# Patient Record
Sex: MALE | Race: WHITE | NOT HISPANIC OR LATINO | Employment: OTHER | URBAN - METROPOLITAN AREA
[De-identification: names, ages, dates, MRNs, and addresses within clinical notes are randomized per-mention and may not be internally consistent; named-entity substitution may affect disease eponyms.]

---

## 2020-10-19 ENCOUNTER — HOSPITAL ENCOUNTER (EMERGENCY)
Facility: CLINIC | Age: 55
Discharge: STILL ADMITTED OR ADMINISTRATIVE | End: 2020-10-19
Payer: COMMERCIAL

## 2021-08-26 NOTE — PROGRESS NOTES
CARDIOLOGY CONSULT    REASON FOR CONSULT: CAD    PRIMARY CARE PHYSICIAN:  Gerry Ellis    HISTORY OF PRESENT ILLNESS:  56-year-old male seen for CAD.    2017 he presented with STEMI in [place].  He had 100% proximal LAD occlusion, minimal disease elsewhere.  Single drug-eluting stent was placed to the proximal LAD.  Echo showed EF 35%, mid to distal anteroseptal akinesis, no valve disease.    Outside echo May 2019 showed EF 50%, anterior akinesis, no valve disease.    Echo April 2021 from [facility] showed EF 40%, mid to distal anteroseptal and apical hypokinesis, no valve disease.  Nuclear stress showed medium to large transmural infarct of the mid to apical anterior and anteroseptal walls, EF 40%.    He has been feeling well recently.  He is very physically active.  He will go biking up to 60 minutes and do other physical activity.  He denies any exertional chest pain.  He will have some dyspnea at times when he lays down at night.  He has as needed Lasix for this occasion, but has not used it.  Blood pressure runs about 100/60.     PAST MEDICAL HISTORY:  1.  CAD    MEDICATIONS:  Current Outpatient Medications   Medication     atorvastatin (LIPITOR) 80 MG tablet     losartan (COZAAR) 50 MG tablet     metoprolol succinate ER (TOPROL-XL) 100 MG 24 hr tablet     nitroGLYcerin (NITROSTAT) 0.4 MG sublingual tablet     spironolactone (ALDACTONE) 25 MG tablet     No current facility-administered medications for this visit.     ALLERGIES:  Not on File    SOCIAL HISTORY:  Non-smoker.  Minimal alcohol use.    FAMILY HISTORY:  No premature CAD.    REVIEW OF SYSTEMS:  Constitutional:  No weight loss, fever, chills, weakness or fatigue.  HEENT:  Eyes:  No visual loss, blurred vision, double vision or yellow sclerae. No hearing loss, sneezing, congestion, runny nose or sore throat.  Skin:  No rash or itching.  Cardiovascular: per HPI  Respiratory: per HPI  GI:  No anorexia, nausea, vomiting or diarrhea. No  "abdominal pain or blood.  :  No dysurea, hematuria  Neurologic:  No headache, dizziness, syncope, paralysis, ataxia, numbness or tingling in the extremities. No change in bowel or bladder control.  Musculoskeletal:  No muscle, back pain, joint pain or stiffness.  Hematologic:  No anemia, bleeding or bruising.  Lymphatics:  No enlarged nodes. No history of splenectomy.  Psychiatric:  No history of depression or anxiety.  Endocrine:  No reports of sweating, cold or heat intolerance. No polyuria or polydipsia.  Allergies:  No history of asthma, hives, eczema or rhinitis.    PHYSICAL EXAM:  /66 (BP Location: Right arm, Patient Position: Chair, Cuff Size: Adult Regular)   Pulse 55   Ht 1.753 m (5' 9\")   Wt 85.4 kg (188 lb 4.8 oz)   SpO2 99%   BMI 27.81 kg/m      Constitutional: awake, alert, no distress  Eyes: PERRL, sclera nonicteric  ENT: trachea midline  Respiratory: Lungs clear  Cardiovascular: Regular rate and rhythm, no murmurs  GI: nondistended, nontender, bowel sounds present  Lymph/Hematologic: no lymphadenopathy  Skin: dry, no rash  Musculoskeletal: good muscle tone, strength 5/5 in upper and lower extremities  Neurologic: no focal deficits  Neuropsychiatric: appropriate affact    DATA:  Labs:   January 2021: Cholesterol 122, HDL 43, triglycerides 99, LDL 59  April 2021: Creatinine 0.8    EKG: September 1, 2021: Sinus bradycardia, rate 50, anterior Q waves    ASSESSMENT:  56-year-old male seen for ischemic cardiomyopathy.  He is doing very well with no concerning symptoms.  He has occasional dyspnea at night.  He could take Lasix as needed, he may have a touch of fluid retention intermittently.  Otherwise he has no anginal symptoms.  Echo will be repeated next year.  He is on good medical therapy.    RECOMMENDATIONS:  1.  Ischemic cardiomyopathy, NYHA class I  -Continue current medications  -Echo in 1 year on follow-up  -Lasix as needed for dyspnea    Follow-up in 1 year after echo.    Levy" MD Sky  Cardiology - [facility]  Pager:  [phone]  Text Page  September 1, 2021

## 2021-09-01 ENCOUNTER — OFFICE VISIT (OUTPATIENT)
Dept: CARDIOLOGY | Facility: CLINIC | Age: 56
End: 2021-09-01
Payer: COMMERCIAL

## 2021-09-01 VITALS
WEIGHT: 188.3 LBS | HEART RATE: 55 BPM | SYSTOLIC BLOOD PRESSURE: 110 MMHG | OXYGEN SATURATION: 99 % | DIASTOLIC BLOOD PRESSURE: 66 MMHG | BODY MASS INDEX: 27.89 KG/M2 | HEIGHT: 69 IN

## 2021-09-01 DIAGNOSIS — I25.5 ISCHEMIC CARDIOMYOPATHY: ICD-10-CM

## 2021-09-01 DIAGNOSIS — Z13.6 SCREENING FOR HEART DISEASE: Primary | ICD-10-CM

## 2021-09-01 PROCEDURE — 99204 OFFICE O/P NEW MOD 45 MIN: CPT | Mod: 25 | Performed by: INTERNAL MEDICINE

## 2021-09-01 PROCEDURE — 93000 ELECTROCARDIOGRAM COMPLETE: CPT | Performed by: INTERNAL MEDICINE

## 2021-09-01 RX ORDER — LOSARTAN POTASSIUM 50 MG/1
50 TABLET ORAL DAILY
COMMUNITY
Start: 2021-06-29

## 2021-09-01 RX ORDER — METOPROLOL SUCCINATE 100 MG/1
100 TABLET, EXTENDED RELEASE ORAL
COMMUNITY
Start: 2020-02-13

## 2021-09-01 RX ORDER — NITROGLYCERIN 0.4 MG/1
0.4 TABLET SUBLINGUAL EVERY 5 MIN PRN
Qty: 20 TABLET | Refills: 1 | Status: SHIPPED | OUTPATIENT
Start: 2021-09-01 | End: 2023-09-06

## 2021-09-01 RX ORDER — SPIRONOLACTONE 25 MG/1
1 TABLET ORAL
COMMUNITY
Start: 2020-01-17

## 2021-09-01 RX ORDER — NITROGLYCERIN 0.4 MG/1
0.4 TABLET SUBLINGUAL
COMMUNITY
Start: 2020-02-13 | End: 2021-09-01

## 2021-09-01 RX ORDER — ATORVASTATIN CALCIUM 80 MG/1
80 TABLET, FILM COATED ORAL
COMMUNITY
Start: 2020-02-13

## 2021-09-01 ASSESSMENT — MIFFLIN-ST. JEOR: SCORE: 1679.5

## 2021-09-01 NOTE — LETTER
9/1/2021    Gerry Ellis PA-C  [id number] [facility] [address]    RE: Alvarado Brien       Dear Colleague,    I had the pleasure of seeing Alvarado Tesfaye in the [facility].    CARDIOLOGY CONSULT    REASON FOR CONSULT: CAD    PRIMARY CARE PHYSICIAN:  Gerry Ellis    HISTORY OF PRESENT ILLNESS:  56-year-old male seen for CAD.    2017 he presented with STEMI in [place].  He had 100% proximal LAD occlusion, minimal disease elsewhere.  Single drug-eluting stent was placed to the proximal LAD.  Echo showed EF 35%, mid to distal anteroseptal akinesis, no valve disease.    Outside echo May 2019 showed EF 50%, anterior akinesis, no valve disease.    Echo April 2021 from [facility] showed EF 40%, mid to distal anteroseptal and apical hypokinesis, no valve disease.  Nuclear stress showed medium to large transmural infarct of the mid to apical anterior and anteroseptal walls, EF 40%.    He has been feeling well recently.  He is very physically active.  He will go biking up to 60 minutes and do other physical activity.  He denies any exertional chest pain.  He will have some dyspnea at times when he lays down at night.  He has as needed Lasix for this occasion, but has not used it.  Blood pressure runs about 100/60.     PAST MEDICAL HISTORY:  1.  CAD    MEDICATIONS:  Current Outpatient Medications   Medication     atorvastatin (LIPITOR) 80 MG tablet     losartan (COZAAR) 50 MG tablet     metoprolol succinate ER (TOPROL-XL) 100 MG 24 hr tablet     nitroGLYcerin (NITROSTAT) 0.4 MG sublingual tablet     spironolactone (ALDACTONE) 25 MG tablet     No current facility-administered medications for this visit.     ALLERGIES:  Not on File    SOCIAL HISTORY:  Non-smoker.  Minimal alcohol use.    FAMILY HISTORY:  No premature CAD.    REVIEW OF SYSTEMS:  Constitutional:  No weight loss, fever, chills, weakness or fatigue.  HEENT:  Eyes:  No visual loss,  "blurred vision, double vision or yellow sclerae. No hearing loss, sneezing, congestion, runny nose or sore throat.  Skin:  No rash or itching.  Cardiovascular: per HPI  Respiratory: per HPI  GI:  No anorexia, nausea, vomiting or diarrhea. No abdominal pain or blood.  :  No dysurea, hematuria  Neurologic:  No headache, dizziness, syncope, paralysis, ataxia, numbness or tingling in the extremities. No change in bowel or bladder control.  Musculoskeletal:  No muscle, back pain, joint pain or stiffness.  Hematologic:  No anemia, bleeding or bruising.  Lymphatics:  No enlarged nodes. No history of splenectomy.  Psychiatric:  No history of depression or anxiety.  Endocrine:  No reports of sweating, cold or heat intolerance. No polyuria or polydipsia.  Allergies:  No history of asthma, hives, eczema or rhinitis.    PHYSICAL EXAM:  /66 (BP Location: Right arm, Patient Position: Chair, Cuff Size: Adult Regular)   Pulse 55   Ht 1.753 m (5' 9\")   Wt 85.4 kg (188 lb 4.8 oz)   SpO2 99%   BMI 27.81 kg/m      Constitutional: awake, alert, no distress  Eyes: PERRL, sclera nonicteric  ENT: trachea midline  Respiratory: Lungs clear  Cardiovascular: Regular rate and rhythm, no murmurs  GI: nondistended, nontender, bowel sounds present  Lymph/Hematologic: no lymphadenopathy  Skin: dry, no rash  Musculoskeletal: good muscle tone, strength 5/5 in upper and lower extremities  Neurologic: no focal deficits  Neuropsychiatric: appropriate affact    DATA:  Labs:   January 2021: Cholesterol 122, HDL 43, triglycerides 99, LDL 59  April 2021: Creatinine 0.8    EKG: September 1, 2021: Sinus bradycardia, rate 50, anterior Q waves    ASSESSMENT:  56-year-old male seen for ischemic cardiomyopathy.  He is doing very well with no concerning symptoms.  He has occasional dyspnea at night.  He could take Lasix as needed, he may have a touch of fluid retention intermittently.  Otherwise he has no anginal symptoms.  Echo will be repeated next " year.  He is on good medical therapy.    RECOMMENDATIONS:  1.  Ischemic cardiomyopathy, NYHA class I  -Continue current medications  -Echo in 1 year on follow-up  -Lasix as needed for dyspnea    Follow-up in 1 year after echo.    Levy Swanson MD  Cardiology - [facility]  Pager:  [phone]  Text Page  September 1, 2021          Thank you for allowing me to participate in the care of your patient.      Sincerely,     Levy Swanson MD     [facility]  cc:   No referring provider defined for this encounter.

## 2023-09-06 ENCOUNTER — OFFICE VISIT (OUTPATIENT)
Dept: CARDIOLOGY | Facility: CLINIC | Age: 58
End: 2023-09-06
Payer: COMMERCIAL

## 2023-09-06 VITALS
BODY MASS INDEX: 28.96 KG/M2 | SYSTOLIC BLOOD PRESSURE: 104 MMHG | OXYGEN SATURATION: 98 % | WEIGHT: 196.1 LBS | DIASTOLIC BLOOD PRESSURE: 60 MMHG | HEART RATE: 57 BPM

## 2023-09-06 DIAGNOSIS — I25.5 ISCHEMIC CARDIOMYOPATHY: Primary | ICD-10-CM

## 2023-09-06 PROCEDURE — 99215 OFFICE O/P EST HI 40 MIN: CPT | Performed by: INTERNAL MEDICINE

## 2023-09-06 RX ORDER — NITROGLYCERIN 0.4 MG/1
0.4 TABLET SUBLINGUAL EVERY 5 MIN PRN
Qty: 25 TABLET | Refills: 1 | Status: SHIPPED | OUTPATIENT
Start: 2023-09-06

## 2023-09-06 RX ORDER — FLUOCINOLONE ACETONIDE 0.1 MG/ML
SOLUTION TOPICAL
COMMUNITY

## 2023-09-06 RX ORDER — TRIAMCINOLONE ACETONIDE 1 MG/G
CREAM TOPICAL
COMMUNITY
Start: 2023-02-07

## 2023-09-06 NOTE — PROGRESS NOTES
HISTORY:    Alvarado Tesfaye is a very pleasant 58-year-old gentleman with a history of his myocardial infarct.  He presented to a hospital in [place] in 2017 with mild symptoms of chest pressure shortness of breath and tightness in his jaw.  He presented 12 hours after onset of symptoms and was found to have an occluded LAD.  He has a dense apical infarct.  His EF was initially only 35% but has recovered somewhat to much as 50% on follow-up echocardiograms.  He wis on anticoagulation for about a year but this was subsequently stopped.  He is here today for routine follow-up visit.    Alvarado was last seen 2 years ago and reports that he has remained physically extremely active doing a lot of hiking biking and kayaking.  In July while he was in [place] on a very hot day he became lightheaded with associated arm and jaw numbness.  He went to an emergency room, concerned that this may be of cardiac origin but an ECG and labs were normal and he was felt to just be dehydrated.  A month later he experienced fluttering sensation in his low to mid chest he thinks was professional and might represent muscle spasm.  This lasted only about 1 minute.  He never experienced such an episode before did not seek attention for it.  It has not recurred.    Alvarado denies any current cardiac symptoms such as exertional chest pain, PND/orthopnea, syncope or near syncope, strokelike symptoms, referral edema, or claudication.  As stated above he remains extremely physically active.      ASSESSMENT/PLAN:    1.  Coronary artery disease.  The patient remains physically active with no symptoms of angina and risk factors are well controlled including blood pressure of 104/60 today and lipids are controlled.  An echocardiogram will be repeated since he has not had evaluation for 2-1/2 years.  2.  Hyper lipidemia.  Lipids are very well controlled on current regimen with cholesterol 124, triglycerides 97, HDL 44, and LDL 62 by outside measurement.  3.   Ischemic cardiomyopathy.  Echocardiogram will be repeated as outlined above.  I have also started him on Jardiance 10 mg daily.  This is now class I recommendation in someone with his ejection fraction (GDMT).  Potential side effects were described for him    Thank you for inviting me to participate in the care of your patient.  Please don't hesitate to call if I can be of further assistance.  40 minutes were spent today reviewing the chart and other records, interviewing and examining the patient, and documenting our visit.    Chart documentation was completed, in part, with [employer] voice-recognition software. Even though reviewed, some grammatical, spelling, and word errors may remain.       Orders Placed This Encounter   Procedures    Follow-Up with Cardiologist    Echocardiogram Complete     Orders Placed This Encounter   Medications    fluocinolone (SYNALAR) 0.01 % solution     Sig: APPLY TOPICALLY TO AFFECTED AREA TWICE DAILY. USE ON SCALP ONLY. LIMIT DURATION OF USE TO 7 DAYS IN A ROW BEFORE TAKING A BREAK. DO NOT START UNTIL COMPLETING 5 DAYS OF ANTIBIOTIC OINTMENT ON THE SCALP.    triamcinolone (KENALOG) 0.1 % external cream     Sig: Apply topically to affected area(s) 2 times daily if needed (eczema). For use on hands, do not use on face.    empagliflozin (JARDIANCE) 10 MG TABS tablet     Sig: Take 1 tablet (10 mg) by mouth daily     Dispense:  30 tablet     Refill:  11    nitroGLYcerin (NITROSTAT) 0.4 MG sublingual tablet     Sig: Place 1 tablet (0.4 mg) under the tongue every 5 minutes as needed for chest pain     Dispense:  25 tablet     Refill:  1     Medications Discontinued During This Encounter   Medication Reason    nitroGLYcerin (NITROSTAT) 0.4 MG sublingual tablet Reorder (No AVS)       10 year ASCVD risk: The ASCVD Risk score (Rogelio DK, et al., 2019) failed to calculate for the following reasons:    Cannot find a previous HDL lab    Cannot find a previous total cholesterol lab    Encounter  Diagnosis   Name Primary?    Ischemic cardiomyopathy Yes       CURRENT MEDICATIONS:  Current Outpatient Medications   Medication Sig Dispense Refill    atorvastatin (LIPITOR) 80 MG tablet Take 80 mg by mouth      empagliflozin (JARDIANCE) 10 MG TABS tablet Take 1 tablet (10 mg) by mouth daily 30 tablet 11    fluocinolone (SYNALAR) 0.01 % solution APPLY TOPICALLY TO AFFECTED AREA TWICE DAILY. USE ON SCALP ONLY. LIMIT DURATION OF USE TO 7 DAYS IN A ROW BEFORE TAKING A BREAK. DO NOT START UNTIL COMPLETING 5 DAYS OF ANTIBIOTIC OINTMENT ON THE SCALP.      losartan (COZAAR) 50 MG tablet Take 50 mg by mouth daily      metoprolol succinate ER (TOPROL-XL) 100 MG 24 hr tablet Take 100 mg by mouth      nitroGLYcerin (NITROSTAT) 0.4 MG sublingual tablet Place 1 tablet (0.4 mg) under the tongue every 5 minutes as needed for chest pain 25 tablet 1    spironolactone (ALDACTONE) 25 MG tablet Take 1 tablet by mouth      triamcinolone (KENALOG) 0.1 % external cream Apply topically to affected area(s) 2 times daily if needed (eczema). For use on hands, do not use on face.         ALLERGIES     Allergies   Allergen Reactions    Lisinopril Other (See Comments)     cough  cough  cough  cough         PAST MEDICAL HISTORY:  History reviewed. No pertinent past medical history.    PAST SURGICAL HISTORY:  History reviewed. No pertinent surgical history.    FAMILY HISTORY:  Family History   Problem Relation Age of Onset    Colon Cancer Father     Prostate Cancer Father        SOCIAL HISTORY:  Social History     Socioeconomic History    Marital status:      Spouse name: None    Number of children: None    Years of education: None    Highest education level: None   Tobacco Use    Smoking status: Never    Smokeless tobacco: Never   Substance and Sexual Activity    Alcohol use: Yes    Drug use: Never    Sexual activity: Yes     Partners: Female       Review of Systems:  Skin:  Negative     Eyes:  Negative    ENT:  Negative    Respiratory:   Positive for shortness of breath  Cardiovascular:    Positive for;palpitations;chest pain;lightheadedness;fatigue;dizziness  Gastroenterology: Positive for heartburn  Genitourinary:  Negative    Musculoskeletal:  Positive for joint pain  Neurologic:  Negative    Psychiatric:  Negative    Heme/Lymph/Imm:  Negative    Endocrine:  Negative      Physical Exam:  Vitals: /60   Pulse 57   Wt 89 kg (196 lb 1.6 oz)   SpO2 98%   BMI 28.96 kg/m      Constitutional:  cooperative, alert and oriented, well developed, well nourished, in no acute distress        Skin:  warm and dry to the touch, no apparent skin lesions or masses noted        Head:  normocephalic, no masses or lesions        Eyes:  pupils equal and round, conjunctivae and lids unremarkable, sclera white, no xanthalasma, EOMS intact, no nystagmus        ENT:  no pallor or cyanosis        Neck:  carotid pulses are full and equal bilaterally, JVP normal, no carotid bruit        Chest:  normal breath sounds, clear to auscultation, normal A-P diameter, normal symmetry, normal respiratory excursion, no use of accessory muscles        Cardiac: regular rhythm, normal S1/S2, no S3 or S4, apical impulse not displaced, no murmurs, gallops or rubs                  Abdomen:  abdomen soft, non-tender, BS normoactive, no mass, no HSM, no bruits        Vascular: pulses full and equal                                      Extremities and Muscular Skeletal:  no edema           Neurological:  no gross motor deficits        Psych:  affect appropriate, oriented to time, person and place     Recent Lab Results:  LIPID RESULTS:  No results found for: CHOL, HDL, LDL, TRIG, CHOLHDLRATIO    LIVER ENZYME RESULTS:  No results found for: AST, ALT    CBC RESULTS:  No results found for: WBC, RBC, HGB, HCT, MCV, MCH, MCHC, RDW, PLT    BMP RESULTS:  No results found for: NA, POTASSIUM, CHLORIDE, CO2, ANIONGAP, GLC, BUN, CR, GFRESTIMATED, GFRESTBLACK, SONU     A1C RESULTS:  No results found  for: A1C    INR RESULTS:  No results found for: INR      Beau Martell MD, FAC    CC  No referring provider defined for this encounter.

## 2023-09-06 NOTE — LETTER
9/6/2023    [facility]  [address] [address]    RE: Alvarado Tesfaye       Dear Colleague,     I had the pleasure of seeing Alvarado Tesfaye in the [facility].  HISTORY:    Alvarado Tesfaye is a very pleasant 58-year-old gentleman with a history of his myocardial infarct.  He presented to a hospital in [place] in 2017 with mild symptoms of chest pressure shortness of breath and tightness in his jaw.  He presented 12 hours after onset of symptoms and was found to have an occluded LAD.  He has a dense apical infarct.  His EF was initially only 35% but has recovered somewhat to much as 50% on follow-up echocardiograms.  He wis on anticoagulation for about a year but this was subsequently stopped.  He is here today for routine follow-up visit.    Alvarado was last seen 2 years ago and reports that he has remained physically extremely active doing a lot of hiking biking and kayaking.  In July while he was in [place] on a very hot day he became lightheaded with associated arm and jaw numbness.  He went to an emergency room, concerned that this may be of cardiac origin but an ECG and labs were normal and he was felt to just be dehydrated.  A month later he experienced fluttering sensation in his low to mid chest he thinks was professional and might represent muscle spasm.  This lasted only about 1 minute.  He never experienced such an episode before did not seek attention for it.  It has not recurred.    Alvarado denies any current cardiac symptoms such as exertional chest pain, PND/orthopnea, syncope or near syncope, strokelike symptoms, referral edema, or claudication.  As stated above he remains extremely physically active.      ASSESSMENT/PLAN:    1.  Coronary artery disease.  The patient remains physically active with no symptoms of angina and risk factors are well controlled including blood pressure of 104/60 today and lipids are controlled.  An echocardiogram will be repeated since he has  not had evaluation for 2-1/2 years.  2.  Hyper lipidemia.  Lipids are very well controlled on current regimen with cholesterol 124, triglycerides 97, HDL 44, and LDL 62 by outside measurement.  3.  Ischemic cardiomyopathy.  Echocardiogram will be repeated as outlined above.  I have also started him on Jardiance 10 mg daily.  This is now class I recommendation in someone with his ejection fraction (GDMT).  Potential side effects were described for him    Thank you for inviting me to participate in the care of your patient.  Please don't hesitate to call if I can be of further assistance.  40 minutes were spent today reviewing the chart and other records, interviewing and examining the patient, and documenting our visit.    Chart documentation was completed, in part, with Panjo voice-recognition software. Even though reviewed, some grammatical, spelling, and word errors may remain.       Orders Placed This Encounter   Procedures    Follow-Up with Cardiologist    Echocardiogram Complete     Orders Placed This Encounter   Medications    fluocinolone (SYNALAR) 0.01 % solution     Sig: APPLY TOPICALLY TO AFFECTED AREA TWICE DAILY. USE ON SCALP ONLY. LIMIT DURATION OF USE TO 7 DAYS IN A ROW BEFORE TAKING A BREAK. DO NOT START UNTIL COMPLETING 5 DAYS OF ANTIBIOTIC OINTMENT ON THE SCALP.    triamcinolone (KENALOG) 0.1 % external cream     Sig: Apply topically to affected area(s) 2 times daily if needed (eczema). For use on hands, do not use on face.    empagliflozin (JARDIANCE) 10 MG TABS tablet     Sig: Take 1 tablet (10 mg) by mouth daily     Dispense:  30 tablet     Refill:  11    nitroGLYcerin (NITROSTAT) 0.4 MG sublingual tablet     Sig: Place 1 tablet (0.4 mg) under the tongue every 5 minutes as needed for chest pain     Dispense:  25 tablet     Refill:  1     Medications Discontinued During This Encounter   Medication Reason    nitroGLYcerin (NITROSTAT) 0.4 MG sublingual tablet Reorder (No AVS)       10 year ASCVD  risk: The ASCVD Risk score (Rogelio MATT, et al., 2019) failed to calculate for the following reasons:    Cannot find a previous HDL lab    Cannot find a previous total cholesterol lab    Encounter Diagnosis   Name Primary?    Ischemic cardiomyopathy Yes       CURRENT MEDICATIONS:  Current Outpatient Medications   Medication Sig Dispense Refill    atorvastatin (LIPITOR) 80 MG tablet Take 80 mg by mouth      empagliflozin (JARDIANCE) 10 MG TABS tablet Take 1 tablet (10 mg) by mouth daily 30 tablet 11    fluocinolone (SYNALAR) 0.01 % solution APPLY TOPICALLY TO AFFECTED AREA TWICE DAILY. USE ON SCALP ONLY. LIMIT DURATION OF USE TO 7 DAYS IN A ROW BEFORE TAKING A BREAK. DO NOT START UNTIL COMPLETING 5 DAYS OF ANTIBIOTIC OINTMENT ON THE SCALP.      losartan (COZAAR) 50 MG tablet Take 50 mg by mouth daily      metoprolol succinate ER (TOPROL-XL) 100 MG 24 hr tablet Take 100 mg by mouth      nitroGLYcerin (NITROSTAT) 0.4 MG sublingual tablet Place 1 tablet (0.4 mg) under the tongue every 5 minutes as needed for chest pain 25 tablet 1    spironolactone (ALDACTONE) 25 MG tablet Take 1 tablet by mouth      triamcinolone (KENALOG) 0.1 % external cream Apply topically to affected area(s) 2 times daily if needed (eczema). For use on hands, do not use on face.         ALLERGIES     Allergies   Allergen Reactions    Lisinopril Other (See Comments)     cough  cough  cough  cough         PAST MEDICAL HISTORY:  History reviewed. No pertinent past medical history.    PAST SURGICAL HISTORY:  History reviewed. No pertinent surgical history.    FAMILY HISTORY:  Family History   Problem Relation Age of Onset    Colon Cancer Father     Prostate Cancer Father        SOCIAL HISTORY:  Social History     Socioeconomic History    Marital status:      Spouse name: None    Number of children: None    Years of education: None    Highest education level: None   Tobacco Use    Smoking status: Never    Smokeless tobacco: Never   Substance and  Sexual Activity    Alcohol use: Yes    Drug use: Never    Sexual activity: Yes     Partners: Female       Review of Systems:  Skin:  Negative     Eyes:  Negative    ENT:  Negative    Respiratory:  Positive for shortness of breath  Cardiovascular:    Positive for;palpitations;chest pain;lightheadedness;fatigue;dizziness  Gastroenterology: Positive for heartburn  Genitourinary:  Negative    Musculoskeletal:  Positive for joint pain  Neurologic:  Negative    Psychiatric:  Negative    Heme/Lymph/Imm:  Negative    Endocrine:  Negative      Physical Exam:  Vitals: /60   Pulse 57   Wt 89 kg (196 lb 1.6 oz)   SpO2 98%   BMI 28.96 kg/m      Constitutional:  cooperative, alert and oriented, well developed, well nourished, in no acute distress        Skin:  warm and dry to the touch, no apparent skin lesions or masses noted        Head:  normocephalic, no masses or lesions        Eyes:  pupils equal and round, conjunctivae and lids unremarkable, sclera white, no xanthalasma, EOMS intact, no nystagmus        ENT:  no pallor or cyanosis        Neck:  carotid pulses are full and equal bilaterally, JVP normal, no carotid bruit        Chest:  normal breath sounds, clear to auscultation, normal A-P diameter, normal symmetry, normal respiratory excursion, no use of accessory muscles        Cardiac: regular rhythm, normal S1/S2, no S3 or S4, apical impulse not displaced, no murmurs, gallops or rubs                  Abdomen:  abdomen soft, non-tender, BS normoactive, no mass, no HSM, no bruits        Vascular: pulses full and equal                                      Extremities and Muscular Skeletal:  no edema           Neurological:  no gross motor deficits        Psych:  affect appropriate, oriented to time, person and place     Recent Lab Results:  LIPID RESULTS:  No results found for: CHOL, HDL, LDL, TRIG, CHOLHDLRATIO    LIVER ENZYME RESULTS:  No results found for: AST, ALT    CBC RESULTS:  No results found for: WBC,  RBC, HGB, HCT, MCV, MCH, MCHC, RDW, PLT    BMP RESULTS:  No results found for: NA, POTASSIUM, CHLORIDE, CO2, ANIONGAP, GLC, BUN, CR, GFRESTIMATED, GFRESTBLACK, SONU     A1C RESULTS:  No results found for: A1C    INR RESULTS:  No results found for: INR      Beau Martell MD, [facility]    CC  No referring provider defined for this encounter.                    Thank you for allowing me to participate in the care of your patient.      Sincerely,     Beau Martell MD     [facility]

## 2023-09-27 ENCOUNTER — HOSPITAL ENCOUNTER (OUTPATIENT)
Dept: CARDIOLOGY | Facility: CLINIC | Age: 58
Discharge: HOME | End: 2023-09-27
Attending: INTERNAL MEDICINE | Admitting: INTERNAL MEDICINE
Payer: COMMERCIAL

## 2023-09-27 DIAGNOSIS — I25.5 ISCHEMIC CARDIOMYOPATHY: ICD-10-CM

## 2023-09-27 LAB — LVEF ECHO: NORMAL

## 2023-09-27 PROCEDURE — 93306 TTE W/DOPPLER COMPLETE: CPT | Mod: 26 | Performed by: INTERNAL MEDICINE

## 2023-09-27 PROCEDURE — 93306 TTE W/DOPPLER COMPLETE: CPT

## 2023-09-29 ENCOUNTER — TELEPHONE (OUTPATIENT)
Dept: CARDIOLOGY | Facility: CLINIC | Age: 58
End: 2023-09-29
Payer: COMMERCIAL

## 2023-09-29 NOTE — TELEPHONE ENCOUNTER
Patient notified of results of echo and EF is stable.  Patient verbalized understanding.        ----- Message from Beau Martell MD sent at 9/29/2023  3:33 PM CDT -----  Known previous apical infarct with dense wall motion abnormality per previous outside echoes.  Alvarado is very active with good stamina, no need for further studies.  He is on appropriate meds for his reduced EF.

## 2023-10-04 ENCOUNTER — MYC MEDICAL ADVICE (OUTPATIENT)
Dept: CARDIOLOGY | Facility: CLINIC | Age: 58
End: 2023-10-04
Payer: COMMERCIAL

## 2023-10-04 NOTE — TELEPHONE ENCOUNTER
Last OV 9-6-23 HX of CAD Ischemic C on Jaridance,   Echo 9-27-23  Interpretation Summary     Left ventricular systolic function is mildly reduced.  The visual ejection fraction is 45-50%.  Mid to distal septal, apical and distal anterior akinesis. Accuracy limited  due to poor endocardial defintiion in absence of IV contrast use. Patient  declined IV contrast.  Dilated coronary sinus suspected although image quality poor. Consider  additional evaluation to assess for persistent left superior vena cava if  clinically appropriate.  The study was technically difficult. There is no comparison study available.

## 2024-05-05 ENCOUNTER — HEALTH MAINTENANCE LETTER (OUTPATIENT)
Age: 59
End: 2024-05-05

## 2025-05-17 ENCOUNTER — HEALTH MAINTENANCE LETTER (OUTPATIENT)
Age: 60
End: 2025-05-17